# Patient Record
Sex: MALE | Race: WHITE
[De-identification: names, ages, dates, MRNs, and addresses within clinical notes are randomized per-mention and may not be internally consistent; named-entity substitution may affect disease eponyms.]

---

## 2017-03-31 ENCOUNTER — HOSPITAL ENCOUNTER (OUTPATIENT)
Dept: HOSPITAL 39 - GMAM | Age: 72
End: 2017-03-31
Attending: FAMILY MEDICINE
Payer: MEDICARE

## 2017-03-31 DIAGNOSIS — R53.82: Primary | ICD-10-CM

## 2017-04-10 ENCOUNTER — HOSPITAL ENCOUNTER (OUTPATIENT)
Dept: HOSPITAL 39 - CT | Age: 72
Discharge: HOME | End: 2017-04-10
Attending: FAMILY MEDICINE
Payer: MEDICARE

## 2017-04-10 DIAGNOSIS — R93.8: Primary | ICD-10-CM

## 2017-04-10 NOTE — CT
EXAM DESCRIPTION: 

Chest w/Contrast



CLINICAL HISTORY: 

ABNORMAL CXR



COMPARISON: 

None.



TECHNIQUE: 

Post contrasted multidetector CT imaging of the chest.

Multiplanar reconstructions were provided.



FINDINGS: 

The axilla are clear. Supraclavicular soft tissues are

unremarkable. Subcutaneous infusing catheter noted. Tip is in the

SVC. Heart size is unremarkable. No pericardial disease. No

lymphadenopathy within the mediastinum. No hilar lymphadenopathy.



There is a spiculated masslike area seen lateral to the right

hilum within the right upper lobe. This measures 2.2 cm in

diameter. Minimal pleural thickening noted.



IMPRESSION: 

Today's exam demonstrates a wedge-shaped spiculated area

consolidation within the base of the right upper lobe. The

differential based on the shape and border could include focal

pneumonia, a pulmonary infarct from pulmonary embolus or a

malignancy. After treatment I would recommend close interval

follow-up with repeat CT in 2-3 months to document resolution.



Electronically signed by:  Floyd Davis MD  4/10/2017 10:21 AM

CDT

## 2017-04-11 ENCOUNTER — HOSPITAL ENCOUNTER (INPATIENT)
Dept: HOSPITAL 39 - ER | Age: 72
LOS: 2 days | Discharge: HOME | DRG: 291 | End: 2017-04-13
Attending: NURSE PRACTITIONER | Admitting: INTERNAL MEDICINE
Payer: MEDICARE

## 2017-04-11 DIAGNOSIS — Z95.810: ICD-10-CM

## 2017-04-11 DIAGNOSIS — I42.8: ICD-10-CM

## 2017-04-11 DIAGNOSIS — I11.0: Primary | ICD-10-CM

## 2017-04-11 DIAGNOSIS — K21.9: ICD-10-CM

## 2017-04-11 DIAGNOSIS — Z79.84: ICD-10-CM

## 2017-04-11 DIAGNOSIS — I25.2: ICD-10-CM

## 2017-04-11 DIAGNOSIS — I50.9: ICD-10-CM

## 2017-04-11 DIAGNOSIS — Z87.891: ICD-10-CM

## 2017-04-11 DIAGNOSIS — E78.5: ICD-10-CM

## 2017-04-11 DIAGNOSIS — Z86.718: ICD-10-CM

## 2017-04-11 DIAGNOSIS — I48.91: ICD-10-CM

## 2017-04-11 DIAGNOSIS — Z79.01: ICD-10-CM

## 2017-04-11 DIAGNOSIS — Z79.899: ICD-10-CM

## 2017-04-11 DIAGNOSIS — K44.9: ICD-10-CM

## 2017-04-11 DIAGNOSIS — J18.9: ICD-10-CM

## 2017-04-11 DIAGNOSIS — E11.9: ICD-10-CM

## 2017-04-11 DIAGNOSIS — R91.8: ICD-10-CM

## 2017-04-11 NOTE — ED.PDOC
History of Present Illness





- General


Chief Complaint: Respiratory Problem


Stated Complaint: cough,sob


Time Seen by Provider: 17 23:37


Source: patient


Exam Limitations: no limitations





- History of Present Illness


Initial Comments: 


James Soto 72 y/o male with history of afib and seasonal allergies dm2 stated 

that he had been having non productive cough for 10 days and was seen by his md 

this morning regarding his chest CT report showing right upper lobe spiculated 

mass differential dx focal pna,pulmonary infarct or malignancy.He was sent home 

no medication prescribed and tonight cough got worse and getting more sob and 

had low grade fever decided to come to er.


Timing/Duration: other - 10 days ago


Severity: moderate


Possible Cause: chronic episodes


Improving Factors: nothing


Worsening Factors: nothing


Associated Symptoms: fever/chills, shortness of breath, wheezing


Respiratory Risk Factors: exposure to allergen


Allergies/Adverse Reactions: 


Allergies





NO KNOWN ALLERGY Allergy (Verified 17 23:44)


 








Home Medications: 


Ambulatory Orders





Carvedilol [Coreg] 3.125 mg PO BID 11/27/15 


Fluoxetine HCl [Prozac] 30 mg PO DAILY 11/27/15 


Lasix  mg PO DAILY 11/27/15 


Lisinopril 20 mg PO DAILY 11/27/15 


Omeprazole Magnesium [Prilosec Otc] 20 mg PO ACBK 11/27/15 


Sotalol HCl [Betapace] 60 mg PO BID 11/27/15 


Warfarin Sodium [Coumadin] 5 mg PO SUTUTHSA@2100 11/27/15 


Warfarin Sodium [Coumadin] 7.5 mg PO MOWEFR@2100 11/27/15 











Review of Systems





- Review of Systems


Constitutional: States: fever


EENTM: States: nose congestion


Respiratory: States: cough, short of breath


Cardiology: States: no symptoms reported


Gastrointestinal/Abdominal: States: no symptoms reported


Genitourinary: States: no symptoms reported


Musculoskeletal: States: no symptoms reported


Skin: States: no symptoms reported


Neurological: States: no symptoms reported


Endocrine: States: no symptoms reported





Past Medical History (General)





- Patient Medical History


Hx Seizures: No


Hx Stroke: No


Hx Asthma: No


Hx of COPD: No


Hx Cardiac Disorders: Yes - A-Fib, CHF


Hx Congestive Heart Failure: Yes


Hx Pacemaker: Yes


Hx Hypertension: Yes


Hx Diabetes: Yes


Hx MRSA: No


Hx Other PMH: Yes - seasonal allergies


Surgical History: other - aicd/pacemaker;nasal polyp surgery





- Vaccination History


Hx Tetanus, Diphtheria Vaccination: Yes





- Social History


Hx Tobacco Use: Yes - stopped age 27 years


Hx Alcohol Use: Yes - quit 3 years ago


Hx Substance Use: Yes -  cocaine


Hx Physical Abuse: No


Hx Emotional Abuse: No





- Activities of Daily Living


Patient Lives Alone: No - family


Grooming Ability: Independent


Eating (Feeding) Ability: Independent


Toileting Ability: Independent





- Female History


Patient Pregnant: No





Family Medical History





- Family History


  ** Father


Living Status: 


Age at Death (years of age): 60


Cause of Death: MI


Hx Family Asthma: Yes - mom


Hx Family Hypertension: Yes


Hx Cardiac Disease: Yes - dad





  ** Mother


Living Status: 


Hx Family Asthma: Yes





Physical Exam





- Physical Exam


General Appearance: Alert, No apparent distress


Eye Exam: bilateral normal


ENT Exam: normal ENT inspection, hearing grossly normal, TMs normal, pharynx 

normal


Neck: non-tender, full range of motion, supple


Respiratory: chest non-tender, decreased breath sounds, wheezing


Cardiovascular/Chest: normal peripheral pulses, regular rate, rhythm, no edema, 

no gallop, no JVD, no murmur


Gastrointestinal/Abdominal: normal bowel sounds, non tender, soft, no 

organomegaly


Extremity: normal range of motion, non-tender, normal inspection, no pedal edema

, no calf tenderness


Neurologic: no motor/sensory deficits, alert, normal mood/affect, oriented x 3


Skin Exam: normal color, warm/dry


Lymphatic: no adenopathy





Progress





- Results/Orders


Results/Orders: 


 





17 23:47


SVN/Updraft Therapy .ONCE 





17 23:51


YOHANA IFA Screen w/Reflex Stat 


RHEUMATOID FACTOR Routine 





17 23:55


IV Care:Saline Lock per Protoc QSHIFT 





17 09:00


[facility] Daily 








 Laboratory Results











WBC  12.2 K/mm3 (4.8-10.8)  H  17  00:10    


 


RBC  5.46 M/mm3 (4.70-6.10)   17  00:10    


 


Hgb  14.6 gm/dL (14.0-18.0)   17  00:10    


 


Hct  44.9 % (42.0-52.0)   17  00:10    


 


MCV  82.3 fl (80.0-94.0)   17  00:10    


 


MCH  26.8 pg (27.0-31.0)  L  17  00:10    


 


MCHC  32.6 g/dL (33.0-37.0)  L  17  00:10    


 


RDW  15.6 % (11.5-14.5)  H  17  00:10    


 


Plt Count  188 K/mm3 (130-400)   17  00:10    


 


MPV  9.7 fl (7.40-10.4)   17  00:10    


 


Absolute Neuts (auto)  9.50 K/uL (1.8-6.8)  H  17  00:10    


 


Absolute Lymphs (auto)  1.50 K/uL (1.0-3.4)   17  00:10    


 


Absolute Monos (auto)  0.90 K/uL (0.2-0.8)  H  17  00:10    


 


Absolute Eos (auto)  0.20 K/uL (0.0-0.4)   17  00:10    


 


Absolute Basos (auto)  0.10 K/uL (0.0-0.1)   17  00:10    


 


Neutrophils %  78.2 % (42.0-78.0)  H  17  00:10    


 


Lymphocytes %  12.6 % (20.0-50.0)  L  17  00:10    


 


Monocytes %  7.1 % (2.0-9.0)   17  00:10    


 


Eosinophils %  1.4 % (1.0-5.0)   17  00:10    


 


Basophils %  0.7 % (0.0-2.0)   17  00:10    


 


ESR  12 mm/hr (0-20)   17  00:10    


 


PT  25.1 SECONDS (9.4-12.5)  H*  17  00:10    


 


INR  2.240   17  00:10    


 


Sodium  136 mmol/L (135-145)   17  00:10    


 


Potassium  4.1 mmol/L (3.6-5.0)   17  00:10    


 


Chloride  105 mmol/L (101-111)   17  00:10    


 


Carbon Dioxide  23 mmol/L (21-31)   17  00:10    


 


Anion Gap  12.1  (12-18)   17  00:10    


 


BUN  15 mg/dL (7-18)   17  00:10    


 


Creatinine  0.93 mg/dL (0.6-1.3)   17  00:10    


 


BUN/Creatinine Ratio  16.1  (10-20)   17  00:10    


 


Random Glucose  130 mg/dL ()  H  17  00:10    


 


Serum Osmolality  274.5 mOsm/L (275-295)  L  17  00:10    


 


Calcium  9.0 mg/dL (8.4-10.2)   17  00:10    


 


Total Bilirubin  0.8 mg/dL (0.2-1.0)   17  00:10    


 


AST  20 IU/L (10-42)   17  00:10    


 


ALT  17 IU/L (10-60)   17  00:10    


 


Alkaline Phosphatase  62 IU/L ()   17  00:10    


 


B-Natriuretic Peptide  462.0 pg/ml (0-100)  H*  17  00:10    


 


Serum Total Protein  7.4 gm/dL (6.4-8.2)   17  00:10    


 


Albumin  3.8 g/dl (3.2-5.5)   17  00:10    


 


Globulin  3.6 gm/dL (2.3-3.5)  H  17  00:10    


 


Albumin/Globulin Ratio  1.1  (1.1-1.9)   17  00:10    














- EKG/XRAY/CT


XRAY: chest - bilateral interstitial and airspace opcification multifocal 

pneumonia





Departure





- Departure


Clinical Impression: 


 Atrial fibrillation with normal ventricular rate, Long term current use of 

anticoagulant therapy





Pneumonia


Qualifiers:


 Pneumonia type: due to unspecified organism Laterality: bilateral Lung location

: unspecified part of lung Qualifier Code: (J18.9) Pneumonia, unspecified 

organism


Time of Disposition: 01:32 - D/W Dr. Berry-HOSPITALIST


Disposition: Admit Patient


Condition: Fair


Referrals: 


Gregg Kumari MD [Primary Care Provider] - 1-2 Weeks


Home Medications: 


Ambulatory Orders





Carvedilol [Coreg] 3.125 mg PO BID 11/27/15 


Fluoxetine HCl [Prozac] 30 mg PO DAILY 11/27/15 


Lasix  mg PO DAILY 11/27/15 


Lisinopril 20 mg PO DAILY 11/27/15 


Omeprazole Magnesium [Prilosec Otc] 20 mg PO ACBK 11/27/15 


Sotalol HCl [Betapace] 60 mg PO BID 11/27/15 


Warfarin Sodium [Coumadin] 5 mg PO SUTUTHSA@2100 11/27/15 


Warfarin Sodium [Coumadin] 7.5 mg PO MOWEFR@2100 11/27/15

## 2017-04-12 RX ADMIN — IPRATROPIUM BROMIDE AND ALBUTEROL SULFATE SCH ML: .5; 3 SOLUTION RESPIRATORY (INHALATION) at 17:22

## 2017-04-12 RX ADMIN — SOTALOL HYDROCHLORIDE SCH MG: 80 TABLET ORAL at 10:51

## 2017-04-12 RX ADMIN — LEVOFLOXACIN SCH MLS/HR: 500 INJECTION, SOLUTION INTRAVENOUS at 03:00

## 2017-04-12 RX ADMIN — IPRATROPIUM BROMIDE AND ALBUTEROL SULFATE SCH ML: .5; 3 SOLUTION RESPIRATORY (INHALATION) at 08:33

## 2017-04-12 RX ADMIN — IPRATROPIUM BROMIDE AND ALBUTEROL SULFATE SCH ML: .5; 3 SOLUTION RESPIRATORY (INHALATION) at 12:40

## 2017-04-12 RX ADMIN — SOTALOL HYDROCHLORIDE SCH MG: 80 TABLET ORAL at 20:45

## 2017-04-12 RX ADMIN — CEFTRIAXONE SCH MLS/HR: 1 INJECTION, POWDER, FOR SOLUTION INTRAMUSCULAR; INTRAVENOUS at 14:30

## 2017-04-12 RX ADMIN — Medication SCH ML: at 20:45

## 2017-04-12 RX ADMIN — RIVAROXABAN ONE: 15 TABLET, FILM COATED ORAL at 23:12

## 2017-04-12 RX ADMIN — CARVEDILOL SCH MG: 3.12 TABLET, FILM COATED ORAL at 09:14

## 2017-04-12 RX ADMIN — Medication SCH ML: at 09:14

## 2017-04-12 RX ADMIN — OMEPRAZOLE SCH MG: 20 CAPSULE, DELAYED RELEASE ORAL at 05:48

## 2017-04-12 RX ADMIN — CARVEDILOL SCH MG: 3.12 TABLET, FILM COATED ORAL at 20:45

## 2017-04-12 RX ADMIN — IPRATROPIUM BROMIDE AND ALBUTEROL SULFATE SCH ML: .5; 3 SOLUTION RESPIRATORY (INHALATION) at 20:28

## 2017-04-12 RX ADMIN — LISINOPRIL SCH MG: 10 TABLET ORAL at 09:14

## 2017-04-12 RX ADMIN — CARVEDILOL SCH MG: 3.12 TABLET, FILM COATED ORAL at 23:04

## 2017-04-12 RX ADMIN — RIVAROXABAN ONE MG: 15 TABLET, FILM COATED ORAL at 23:03

## 2017-04-12 NOTE — PCM.CORE
Physician DVT/VTE





- Prophylaxis


Currently: Patient already on anticoagulation therapy





- Nurse DVT Assessment & Total


Each Risk Factor Represents 2 Points: Age 60-74


Each Risk Factor is 1 Point: Obesity (BMI >25)


DVT Assessment Score: 3





- 3-4 High Risk


Treatments: Early Ambulation *, Sequential Compression Device

## 2017-04-12 NOTE — RAD
EXAM: Chest,1 View



CLINICAL INDICATION: 71-year-old male with cough.



TECHNIQUE: Single view, AP portable chest was obtained.



COMPARISON:  11/27/2015. 



FINDINGS: 



Stable cardiac and mediastinal silhouette. Heart size is top

normal. Pacemaker device overlies and obscures portions of the

the LEFT hemithorax with leads intact at the level of the battery

pack, stable in course and termination. Tortuous thoracic aorta.

Diffuse bilateral basilar airspace and interstitial opacification

raising the concern for edema versus multifocal pneumonia. No

gross pneumothoraces or large pleural effusion. The RIGHT

costophrenic angle is not included in the field-of-view of the

examination. The visualized bones are within normal limits. 



IMPRESSION: Bilateral interstitial and airspace opacification

raising the concern for multifocal pneumonia versus edema. Please

correlate with patient clinical findings and follow-up for

resolution.



Electronically signed by:  Teri Arenas MD  4/12/2017 12:34 AM CDT

## 2017-04-13 VITALS — DIASTOLIC BLOOD PRESSURE: 77 MMHG | OXYGEN SATURATION: 94 % | SYSTOLIC BLOOD PRESSURE: 116 MMHG

## 2017-04-13 VITALS — TEMPERATURE: 97 F

## 2017-04-13 RX ADMIN — LEVOFLOXACIN SCH MLS/HR: 500 INJECTION, SOLUTION INTRAVENOUS at 02:38

## 2017-04-13 RX ADMIN — Medication SCH ML: at 09:36

## 2017-04-13 RX ADMIN — OMEPRAZOLE SCH MG: 20 CAPSULE, DELAYED RELEASE ORAL at 06:19

## 2017-04-13 RX ADMIN — IPRATROPIUM BROMIDE AND ALBUTEROL SULFATE SCH ML: .5; 3 SOLUTION RESPIRATORY (INHALATION) at 12:35

## 2017-04-13 RX ADMIN — CEFTRIAXONE SCH MLS/HR: 1 INJECTION, POWDER, FOR SOLUTION INTRAMUSCULAR; INTRAVENOUS at 01:28

## 2017-04-13 RX ADMIN — IPRATROPIUM BROMIDE AND ALBUTEROL SULFATE SCH ML: .5; 3 SOLUTION RESPIRATORY (INHALATION) at 07:40

## 2017-04-13 RX ADMIN — SOTALOL HYDROCHLORIDE SCH MG: 80 TABLET ORAL at 09:36

## 2017-04-13 RX ADMIN — LISINOPRIL SCH MG: 10 TABLET ORAL at 09:35

## 2017-04-13 RX ADMIN — CEFTRIAXONE SCH MLS/HR: 1 INJECTION, POWDER, FOR SOLUTION INTRAMUSCULAR; INTRAVENOUS at 13:56

## 2017-04-13 NOTE — RAD
Clinical History : Pneumonia , MAIN



Exam : PA and lateral views of the chest 4/13/2017 7:00 AM CDT



Comparisons : 

Portable AP view of the chest April 12, 2017



Findings : 



There is a stable focal airspace opacity in the right mid lung

laterally.

There is blunting of the bilateral costophrenic angles consistent

with trace pleural effusions.



.



The heart is stable in size.  The mediastinal contours are normal

in appearance.  

There is a left chest 2 lead pacer in place.



The thoracic spine is age appropriate.  The shoulders are

unremarkable.



Limited evaluation of the upper abdomen demonstrates no gross

abnormalities.



Impression:





1. Stable focal airspace opacity in the right mid lung laterally.

2. Trace bilateral pleural effusions.

 



Electronically signed by:  Brittni Sandoval MD  4/13/2017 7:01 AM

CDT

## 2017-04-16 NOTE — DS
SUPERVISING PHYSICIAN:  Jez Chavez M.D.



DISCHARGE DIAGNOSIS: 

1.   Bibasilar pneumonia with the patient showing hypoxemia on admission

      versus exacerbation of his congestive heart failure felt to be more 
likely related

      to congestive heart failure.

2.   Acute on chronic congestive heart failure secondary to nonischemic 

      cardiomyopathy with last echocardiogram in 2016 showing an ejection 

      fraction of 35%.

3.   Atrial fibrillation with admission EKG showing normal ventricular rate 
having 

      been on long term anticoagulation therapy with Coumadin as well as an 

      ICD in place.

4.   Hypertension.

5.   Diabetes mellitus type 2 on oral therapy.

6.   Gastroesophageal reflux disease.

7.   History of recent abnormal CT of the chest showing a spiculated mass of the

      right upper lobe that has been followed in the outpatient setting by Dr. Chavez.

8.   Leukocytosis with initial concerns for the development of pneumonia which 

      was likely community acquired requiring ongoing oxygen supplementation 

      and bronchodilator therapy.



HISTORY OF PRESENT ILLNESS:  Mr. Soto is a 71 year-old  male patient 
that has a history of atrial fibrillation and diabetes.  He noted that he had 
started having a nonproductive cough 10 days previous to admission.  He had 
been seen in the office by Dr. Chavez the previous day regarding followup on a 
chest x-ray and then had a CT of the chest with concerns for a right upper lobe 
spiculated mass.  Questionable differential was focal pulmonary embolism versus 
pulmonary infarct or malignancy.  He went home and the night of admission 
started developing a cough that worsened and he became short of breath as well 
as had a low-grade fever at home at which time he presented to the Emergency 
Department.  In the Emergency Department, a chest x-ray was completed and per 
radiology interpretation showed bilateral interstitial airspace opacification 
with concerns for multifocal pneumonia versus edema.  His laboratory studies 
showed a mildly elevated white count of 12.2 with an early left shift.  He also 
had an elevated BNP at 462.  Initially in the Emergency Department he presented 
afebrile with a temperature of 99.5 showing saturations of 86% on room air.  
After breathing treatments and supplemental oxygen with nasal cannula, he 
improved to 93%.  Given the fact that there were concerns for radiographic 
studies showing possible pneumonia versus edema and having a history of 
congestive heart failure with the last echocardiogram noted to be in 2016 
showing an ejection fraction of 21%, the patient was admitted for continuation 
of treatment and evaluation.  He was admitted to the Medical/Surgical floor in 
stable condition.



LABORATORY STUDIES:  White count was elevated on admission and remained so 
through discharge at 12.4.  Hemoglobin and hematocrit were stable at 13.4 and 
40.3, platelet count 178,000.  He did have a left shift that was resistant.  
Coagulation studies showed PT 25.1 on admission with INR of 2.24 with discharge 
INR of 1.6.  Chemistries showed sodium 133 at discharge with potassium 4.4, BUN 
16, creatinine 0.86, calcium 8.3.  He did have a BNP elevated at 462.  Liver 
functions otherwise were within normal limits.  Urinalysis showed 100 glucose 
and 15 ketones.  Microscopic showed to be within normal limits.  He had a 
rheumatoid factor that was 10 and an YOHANA screen that was negative.  Blood 
cultures times 2 remained negative at 4 days.  No sputum culture was ever 
submitted.



RADIOLOGY:  Initial chest x-ray in the Emergency Department showed per 
radiology interpretation bilateral interstitial and airspace opacification 
raising concerns for multifocal pneumonia versus edema.  This was followed-up 
with a repeat chest x-ray on the morning of discharge on 04/13/17 and per 
radiology interpretation showed stable focal airspace opacification in the 
right middle lung laterally and a trace of bilateral pleural effusions.



HOSPITAL COURSE:  Mr. Soto was admitted from the Emergency Department as noted 
for concerns for bilateral pneumonia versus pulmonary edema.  He was initially 
started on prednisone in the Emergency Department as well as bronchodilator 
therapy with DuoNeb treatments as well as antibiotic coverage with Levaquin and 
Azithromycin.  He was given Lasix 40 mg IV the morning of admission and had 
good diuresis.  His Xarelto was continued as previous to admission and all 
other medications as well.  He did well clinically and was felt well enough to 
be discharged on the morning of discharge as it was felt that his symptomology 
was secondary to acute on chronic congestive heart failure with some pulmonary 
edema, less likely a pneumonia process.  However, given the fact that he did 
have a slightly elevated white count, antibiotics were continued at time of 
discharge.



PLAN:  Mr. Soto was discharged on 04/13/17 to have close clinical followup with 
Dr. Chavez in 2 weeks or earlier if needed and to have close clinical followup 
arranged through Dr. Chavez's office with his cardiologist, Dr. Alicia in 1 
to 2 weeks, again earlier if needed.  He was instructed to resume his home 
medications as instructed and return to the hospital should he have concerning 
symptoms or failure to show any improvement.  He is encourage to restrict his 
daily fluids to less than 1800 mL in a 24 hour period.  He was started on new 
prescriptions at time of discharge to include:



1.   Potassium chloride, K-Dur 20 mEq daily at breakfast, #30.

2.   Lasix 20 mg daily, #30.



Continue with his antibiotic therapy to include Levaquin 500 mg for a total of 
8 days.  All other medications were resumed as per prior to admission.  He was 
discharged with diet to include low fat, low salt, cardiac diet.  Activity was 
to increase activity as tolerated, to do no strenuous exercising until he was 
seen in followup.  Condition at discharge was stable.



#[id number]
[facility]

## 2017-05-22 ENCOUNTER — HOSPITAL ENCOUNTER (OUTPATIENT)
Dept: HOSPITAL 39 - CT | Age: 72
Discharge: HOME | End: 2017-05-22
Payer: MEDICARE

## 2017-05-22 DIAGNOSIS — R91.1: Primary | ICD-10-CM

## 2017-05-22 NOTE — CT
EXAM DESCRIPTION: 



Chest w/o Contrast



CLINICAL HISTORY: 



71 years, Male, PULMONARY NODULE



COMPARISON: 



April 10, 2017



TECHNIQUE: 



Thin-section noncontrast axial CT images are obtained according

to our protocol. Reconstructed MPR images are created and

reviewed as well.



This exam was performed according to our departmental

dose-optimization program, which includes automated exposure

control, adjustment of the mA and/or kV according to patient size

and/or use of iterative reconstruction technique.



FINDINGS: 



Spiculated pulmonary nodule is present in the right upper lobe

just above the minor fissure. This spiculated pulmonary nodule

measures 2.6 x 2.1 x 1.9 cm in size and is concerning for primary

bronchogenic carcinoma.

There is no other nodule observed.

There is no effusion.

Suspicious mediastinal lymphadenopathy is present along the right

anterolateral margin of the trachea. The largest node is

approximately 1.5 cm in size.

Since the previous study from April 10 strandy changes

surrounding this spiculated nodule have decreased.



IMPRESSION: 



1.  Spiculated right upper lobe pulmonary nodule worrisome for

bronchogenic carcinoma. Pulmonary consultation is recommended.

This nodule may be accessible for bronchoscopic biopsy or

bronchoalveolar lavage. CT-guided biopsy would also be feasible.



Electronically signed by:  Zen Mariano MD  5/22/2017 11:13 AM

CDT

## 2017-05-26 ENCOUNTER — HOSPITAL ENCOUNTER (EMERGENCY)
Dept: HOSPITAL 39 - ER | Age: 72
Discharge: HOME | End: 2017-05-26
Payer: MEDICARE

## 2017-05-26 VITALS — OXYGEN SATURATION: 93 % | SYSTOLIC BLOOD PRESSURE: 108 MMHG | DIASTOLIC BLOOD PRESSURE: 52 MMHG | TEMPERATURE: 97.9 F

## 2017-05-26 DIAGNOSIS — R91.1: ICD-10-CM

## 2017-05-26 DIAGNOSIS — Z79.899: ICD-10-CM

## 2017-05-26 DIAGNOSIS — I95.1: Primary | ICD-10-CM

## 2017-05-26 DIAGNOSIS — I25.2: ICD-10-CM

## 2017-05-26 DIAGNOSIS — I10: ICD-10-CM

## 2017-05-26 DIAGNOSIS — Z82.49: ICD-10-CM

## 2017-05-26 DIAGNOSIS — Z95.0: ICD-10-CM

## 2017-05-26 DIAGNOSIS — Z87.891: ICD-10-CM

## 2017-05-26 DIAGNOSIS — Z79.82: ICD-10-CM

## 2017-05-26 DIAGNOSIS — K21.9: ICD-10-CM

## 2017-05-26 NOTE — ED.PDOC
History of Present Illness





- General


Chief Complaint: Cardiovascular Problem


Stated Complaint: Exertional dyspnea, chest tightness


Time Seen by Provider: 17 13:12


Source: patient


Exam Limitations: no limitations





- History of Present Illness


Initial Comments: 





the patient is a 71-year-old  male presenting to the emergency room 

secondary to a feeling of mild progressive shortness of breath and continued 

fatigue.  These have been long-standing problems with him over the last year 

and have been largely related to medications required for heart rate and rhythm 

control.  The patient had an ablation 2 days ago.  The ablation apparently went 

well.  He has had no new swelling.  He is not having any chest pain.  He 

reports that his blood pressures normally run in the 1 teens on the systolic 

end and that is what they are here today.  He however does have a positive tilt 

with a 23 point drop in his systolic blood pressure without any corresponding 

rise in his pulse.  he is reporting some mild dizziness when he goes to stand 

up.  He is not having any shortness of breath while resting but he does get 

short of breath with activity.  No palpitations.  Again no chest pain.  He 

feels weak when he tries to be active.  Again these are not new symptoms but he 

does feel they are somewhat progressive.


Timing/Duration: unsure


Severity: moderate


Improving Factors: nothing, rest


Worsening Factors: movement


Associated Symptoms: shortness of breath


Allergies/Adverse Reactions: 


Allergies





NO KNOWN ALLERGY Allergy (Verified 17 13:49)


 








Home Medications: 


Ambulatory Orders





Fluoxetine HCl [Prozac] 20 mg PO DAILY 11/27/15 


Omeprazole Magnesium [Prilosec Otc] 20 mg PO ACBK 11/27/15 


Sotalol HCl [Betapace] 160 mg PO BID 11/27/15 


Carvedilol [Coreg] 6.25 mg PO BID 17 


Metformin HCl 500 mg PO BID 17 


Mexiletine HCl 150 mg PO BID 17 


Aspirin [Aspirin EC] 81 mg PO DAILY 17 


Diphenhydramine-Acetaminophen [Acetaminophen Pm Extra St 500-25 mg] 1 - 2 tab 

PO BEDTIME PRN 17 


Sacubitril-Valsartan [Entresto 49-51 mg] 1 tab PO BID 17 











Review of Systems





- Review of Systems


Constitutional: States: malaise, weakness


EENTM: States: no symptoms reported


Respiratory: States: short of breath - ith activity


Cardiology: States: no symptoms reported


Gastrointestinal/Abdominal: States: no symptoms reported


Genitourinary: States: no symptoms reported


Musculoskeletal: States: no symptoms reported


Skin: States: no symptoms reported


Neurological: States: depressed


Endocrine: States: no symptoms reported


All other Systems: No Change from Baseline





Past Medical History (General)





- Patient Medical History


Hx Seizures: No


Hx Stroke: No


Hx Asthma: No


Hx of COPD: No


Hx Cardiac Disorders: Yes - MI; cardiac ablation


Hx Congestive Heart Failure: No


Hx Pacemaker: Yes


Hx Hypertension: Yes


Hx Diabetes: No


Hx Gastroesophageal Reflux: Yes


Hx MRSA: No





- Vaccination History


Hx Tetanus, Diphtheria Vaccination: Yes


Hx Influenza Vaccination: No


Hx Pneumococcal Vaccination: No





- Social History


Hx Tobacco Use: Yes - Quit 


Hx Alcohol Use: No


Hx Substance Use: No


Hx Physical Abuse: No


Hx Emotional Abuse: No





- Female History


Patient Pregnant: No





Family Medical History





- Family History


  ** Father


Living Status: 


Age at Death (years of age): 62


Cause of Death: MI


Hx Family Asthma: Yes - mom


Hx Family Hypertension: Yes


Hx Cardiac Disease: Yes - dad





  ** Mother


Family History: Unknown


Living Status: 


Hx Family Asthma: Yes





Physical Exam





- Physical Exam


General Appearance: Alert, Comfortable, No apparent distress


Eye Exam: bilateral normal


Ears, Nose, Throat: normal ENT inspection, normal pharynx


Neck: full range of motion, supple, normal inspection


Respiratory: chest non-tender, lungs clear, normal breath sounds, no 

respiratory distress, no accessory muscle use


Cardiovascular/Chest: normal peripheral pulses, regular rate, rhythm, no edema


Peripheral Pulses: radial,right: 2+, radial,left: 2+, dorsalis pedis,right: 2+, 

dorsalis pedis,left: 2+, posterior tibialis,right: 2+, posterior tibialis,left: 

2+


Gastrointestinal/Abdominal: non tender, soft


Rectal Exam: deferred


Back Exam: normal inspection, no CVA tenderness, no vertebral tenderness


Extremity: normal range of motion, non-tender, normal inspection, no pedal edema

, normal capillary refill


Neurologic: alert, normal mood/affect, oriented x 3


Skin Exam: normal color


Comments: 





 Vital Signs - 24 hr











  17





  13:41 14:05 14:10


 


Temperature 97.2 F L  


 


Pulse Rate [ 80 75 78





Apical]   


 


Respiratory 14 12 14





Rate   


 


Blood Pressure 127/84 119/73 114/72





[Left Arm]   


 


O2 Sat by Pulse 91 L 93 L 95





Oximetry   














  17





  14:15 15:10 16:52


 


Temperature   


 


Pulse Rate [ 93 H 77 71





Apical]   


 


Respiratory 14 14 16





Rate   


 


Blood Pressure 94/66 113/78 123/85





[Left Arm]   


 


O2 Sat by Pulse 94 L 93 L 94 L





Oximetry   














Progress





- Progress


Progress: 





17 18:17


the patient is a 71-year-old  male presenting to the emergency room 

secondary to progressive malaise and weakness and dyspnea on exertion.  The 

patient is tilt positive.  I'm going to have him discontinue his Coreg as he is 

still taking sotalol.  He needs to maintain a symptom diary for his 

cardiologist.  He also needs to record his blood pressures 4 times a day at 

rest.  He has remained on telemetry monitoring here for approximately 5 hours 

and he appears to be in a normal sinus rhythm.  No evidence of any superimposed 

infection was found.  Cardiac enzymes were trending downward since his 

ablation.  He needs to keep follow-up with his primary care doctor early next 

week and with his cardiologist as previously scheduled.  ER warnings were given 

for any acute worsening.he should also increase his fluid intake over the next 2

-3 days only.





- Results/Orders


Results/Orders: 





 Laboratory Tests











  17





  14:02 14:02 14:02


 


WBC   7.3 


 


RBC   5.37 


 


Hgb   14.7 


 


Hct   44.5 


 


MCV   82.9 


 


MCH   27.4 


 


MCHC   33.1 


 


RDW   15.4 H 


 


Plt Count   170 


 


MPV   9.3 


 


Absolute Neuts (auto)   4.90 


 


Absolute Lymphs (auto)   1.60 


 


Absolute Monos (auto)   0.50 


 


Absolute Eos (auto)   0.20 


 


Absolute Basos (auto)   0.10 


 


Neutrophils %   68.0 


 


Lymphocytes %   21.5 


 


Monocytes %   6.8 


 


Eosinophils %   2.5 


 


Basophils %   1.2 


 


PT    18.4 H


 


INR    1.640


 


PTT (SP)    39.9 H


 


Sodium  138  


 


Potassium  4.1  


 


Chloride  104  


 


Carbon Dioxide  28  


 


Anion Gap  10.1 L  


 


BUN  8  


 


Creatinine  0.92  


 


BUN/Creatinine Ratio  8.7 L  


 


Random Glucose  164 H  


 


Serum Osmolality  277.6  


 


Calcium  9.3  


 


Total Bilirubin  0.6  


 


AST  33  


 


ALT  27  


 


Alkaline Phosphatase  56  


 


Creatine Kinase  106  


 


CK-MB (CK-2)  4.5 H  


 


CK-MB (CK-2) %  Not Reportable  


 


Troponin I  1.08 H*  


 


B-Natriuretic Peptide  127.0 H  


 


Serum Total Protein  7.4  


 


Albumin  3.9  


 


Globulin  3.5  


 


Albumin/Globulin Ratio  1.1  


 


Urine Color   


 


Urine Appearance   


 


Urine pH   


 


Ur Specific Gravity   


 


Urine Protein   


 


Urine Glucose (UA)   


 


Urine Ketones   


 


Urine Blood   


 


Urine Nitrite   


 


Urine Bilirubin   


 


Urine Urobilinogen   


 


Ur Leukocyte Esterase   


 


Urine RBC   


 


Urine WBC   


 


Ur Epithelial Cells   


 


Urine Bacteria   














  17





  14:05 17:02


 


WBC  


 


RBC  


 


Hgb  


 


Hct  


 


MCV  


 


MCH  


 


MCHC  


 


RDW  


 


Plt Count  


 


MPV  


 


Absolute Neuts (auto)  


 


Absolute Lymphs (auto)  


 


Absolute Monos (auto)  


 


Absolute Eos (auto)  


 


Absolute Basos (auto)  


 


Neutrophils %  


 


Lymphocytes %  


 


Monocytes %  


 


Eosinophils %  


 


Basophils %  


 


PT  


 


INR  


 


PTT (SP)  


 


Sodium  


 


Potassium  


 


Chloride  


 


Carbon Dioxide  


 


Anion Gap  


 


BUN  


 


Creatinine  


 


BUN/Creatinine Ratio  


 


Random Glucose  


 


Serum Osmolality  


 


Calcium  


 


Total Bilirubin  


 


AST  


 


ALT  


 


Alkaline Phosphatase  


 


Creatine Kinase   97


 


CK-MB (CK-2)   4.1


 


CK-MB (CK-2) %   Not Reportable


 


Troponin I   1.04 H*


 


B-Natriuretic Peptide  


 


Serum Total Protein  


 


Albumin  


 


Globulin  


 


Albumin/Globulin Ratio  


 


Urine Color  Yellow 


 


Urine Appearance  Clear 


 


Urine pH  6.5 


 


Ur Specific Gravity  1.015 


 


Urine Protein  Negative 


 


Urine Glucose (UA)  Negative 


 


Urine Ketones  Negative 


 


Urine Blood  Trace-lysed H 


 


Urine Nitrite  Negative 


 


Urine Bilirubin  Negative 


 


Urine Urobilinogen  0.2 


 


Ur Leukocyte Esterase  Negative 


 


Urine RBC  0-1 


 


Urine WBC  0 


 


Ur Epithelial Cells  0 


 


Urine Bacteria  0 








EKG shows normal sinus rhythm.  It is a paced ventricular rhythm.  Difficult to 

interpret otherwise.  No evidence of significant ST segment elevation or 

depression.


Chest x-ray shows no focal infiltrate.  There is the persistent leftmedial lung 

nodule.





Departure





- Departure


Clinical Impression: 


 Orthostasis





Disposition: Discharge to Home or Self Care


Condition: Fair


Departure Forms:  ED Discharge - Pt. Copy, Patient Portal Self Enrollment


Instructions:  DI for Orthostatic Hypotension


Diet: low salt diet


Activity: increase activity as tolerated


Referrals: 


Jez Chavez MD [Primary Care Provider] - 1-5 Days


Home Medications: 


Ambulatory Orders





Fluoxetine HCl [Prozac] 20 mg PO DAILY 11/27/15 


Omeprazole Magnesium [Prilosec Otc] 20 mg PO ACBK 11/27/15 


Sotalol HCl [Betapace] 160 mg PO BID 11/27/15 


Carvedilol [Coreg] 6.25 mg PO BID 17 


Metformin HCl 500 mg PO BID 17 


Mexiletine HCl 150 mg PO BID 17 


Aspirin [Aspirin EC] 81 mg PO DAILY 17 


Diphenhydramine-Acetaminophen [Acetaminophen Pm Extra St 500-25 mg] 1 - 2 tab 

PO BEDTIME PRN 17 


Sacubitril-Valsartan [Entresto 49-51 mg] 1 tab PO BID 17 








Additional Instructions: 


the patient is a 71-year-old  male presenting to the emergency room 

secondary to progressive malaise and weakness and dyspnea on exertion.  The 

patient is tilt positive.  I'm going to have him discontinue his Coreg as he is 

still taking sotalol.  He needs to maintain a symptom diary for his 

cardiologist.  He also needs to record his blood pressures 4 times a day at 

rest.  He has remained on telemetry monitoring here for approximately 5 hours 

and he appears to be in a normal sinus rhythm.  No evidence of any superimposed 

infection was found.  Cardiac enzymes were trending downward since his 

ablation.  He needs to keep follow-up with his primary care doctor early next 

week and with his cardiologist as previously scheduled.  ER warnings were given 

for any acute worsening.he should also increase his fluid intake over the next 2

-3 days only.

## 2017-05-26 NOTE — RAD
EXAM DESCRIPTION: 



Chest,2 Views



CLINICAL HISTORY: 



2d post ablation with shortness of breath



COMPARISON: 



April 13, 2017



FINDINGS: 



Two-view chest x-ray shows mild enlargement of the cardiac

silhouette without pulmonary vascular congestion. Left subclavian

transvenous cardiac pacer defibrillator is stable.

The lungs are mildly increased aeration. More prominent focus of

increased density in the right mid lung is seen in the area of

interstitial thickening on previous exam. Left lung is clear.

Costophrenic angles are sharp.

Mild spondylitic changes of the spine are seen.



IMPRESSION: 



More localized area of increased density in the right mid chest

is spiculated on recent CT scan of the chest worrisome for

bronchogenic carcinoma.



No pneumothorax or new infiltrate is seen.



Electronically signed by:  Guero Oviedo MD  5/26/2017 2:10 PM CDT

Workstation: [id number]

## 2017-09-01 ENCOUNTER — HOSPITAL ENCOUNTER (OUTPATIENT)
Dept: HOSPITAL 39 - GMAM | Age: 72
Discharge: HOME | End: 2017-09-01
Attending: FAMILY MEDICINE
Payer: MEDICARE

## 2017-09-01 DIAGNOSIS — Z12.5: Primary | ICD-10-CM

## 2018-02-13 ENCOUNTER — HOSPITAL ENCOUNTER (OUTPATIENT)
Dept: HOSPITAL 39 - GMAM | Age: 73
End: 2018-02-13
Attending: FAMILY MEDICINE
Payer: MEDICARE

## 2018-02-13 DIAGNOSIS — E29.8: Primary | ICD-10-CM

## 2019-02-05 ENCOUNTER — HOSPITAL ENCOUNTER (EMERGENCY)
Dept: HOSPITAL 39 - ER | Age: 74
Discharge: HOME | End: 2019-02-05
Payer: MEDICARE

## 2019-02-05 VITALS — OXYGEN SATURATION: 97 %

## 2019-02-05 VITALS — TEMPERATURE: 97.9 F

## 2019-02-05 DIAGNOSIS — Z79.82: ICD-10-CM

## 2019-02-05 DIAGNOSIS — Z79.899: ICD-10-CM

## 2019-02-05 DIAGNOSIS — Z79.01: ICD-10-CM

## 2019-02-05 DIAGNOSIS — Z95.810: ICD-10-CM

## 2019-02-05 DIAGNOSIS — I25.2: ICD-10-CM

## 2019-02-05 DIAGNOSIS — K21.9: ICD-10-CM

## 2019-02-05 DIAGNOSIS — E87.1: ICD-10-CM

## 2019-02-05 DIAGNOSIS — R53.1: ICD-10-CM

## 2019-02-05 DIAGNOSIS — D63.8: ICD-10-CM

## 2019-02-05 DIAGNOSIS — T82.897A: Primary | ICD-10-CM

## 2019-02-05 DIAGNOSIS — I10: ICD-10-CM

## 2019-02-05 NOTE — RAD
EXAM DESCRIPTION: 



Chest,1 View



CLINICAL HISTORY: 



ICD shocked pt this morning x 1. 



COMPARISON: 



None available 



FINDINGS: 



A multilead cardiac pacemaker remains in place. Again seen are

postoperative changes in the mediastinum. There is no airspace

consolidation or pleural effusion. The bronchovascular markings

are within normal limits, and the lungs are not hyperinflated.

There is no pneumothorax or acute fracture.



IMPRESSION: 



Postoperative changes in the mediastinum and cardiac pacemaker,

but no acute intrathoracic abnormality.



Electronically signed by:  Paul Carmona MD  2/5/2019 10:49 AM [facility]

Workstation: [id number]

## 2019-02-05 NOTE — ED.PDOC
History of Present Illness





- General


Chief Complaint: General


Stated Complaint: defib went off


Time Seen by Provider: 19 10:13


Source: patient, family


Exam Limitations: no limitations





- History of Present Illness


Initial Comments: 





ICD DISCHARGED THIS AM WHILE PT WAS STILL ASLEEP.  PT FEELS MILDLY WEAK SINCE 

THE SHOCK BUT NOT OTHER SX.  NO CP/SOB.


PT WAS ASX WHEN THE DISCHARGE OCCURRED.   ICD IS FOR A H/O V FIB.  





ICD PLACED APPROX 10 YRS AGO.  HE HAD IT CHECKED AT A ROUTINE F/U LAST WEEK AND 

IT WAS FUNCTIONING WELL. 





PT ALSO HAS A LEFT VENTRICULAR ASSIST DEVICE (LVAD) FOR END STAGE CHF.  PT HAS 

NO S/SX TODAY OF ACUTE HEART FAILURE EXACERBATION.    





PMH: NIDDM, CHF, H/O VFIB.  








Timing/Duration: 1-3 hours


Severity: moderate


Improving Factors: nothing


Worsening Factors: nothing


Associated Symptoms: weakness


Allergies/Adverse Reactions: 


Allergies





NO KNOWN ALLERGY Allergy (Verified 19 10:08)


   








Home Medications: 


Ambulatory Orders





Fluoxetine HCl [Prozac] 20 mg PO DAILY 11/27/15 


Omeprazole Magnesium [Prilosec Otc] 20 mg PO ACBK 11/27/15 


Carvedilol [Coreg] 6.25 mg PO BID 17 


Mexiletine HCl 150 mg PO TID 17 


Aspirin [(None)] 325 mg PO QD 19 


Calcium Carbonate [Chewable Calcium] 500 mg PO TID 19 


Cetirizine HCl [Zyrtec Allergy] 10 mg PO DAILY 19 


Furosemide [Lasix] 20 mg PO .2XWK 19 


Lisinopril 5 mg PO DAILY 19 


Multiple Vitamin [Multi Vitamin] 1 tab PO DAILY 19 


SITagliptin [Januvia] 100 mg PO DAILY 19 


Simvastatin [Zocor] 40 mg PO BEDTIME 19 


Spironolactone [Aldactone] 25 mg PO DAILY 19 


Warfarin Sodium [Coumadin] 5 mg PO DAILY 19 


Warfarin Sodium [Coumadin] 7.5 mg PO DAILY 19 


Wheat Dextrin [Benefiber] 1 pow PO DAILY 19 


diphenhydrAMINE HCL [Benadryl] 25 mg PO BEDTIME PRN 19 











Review of Systems





- Review of Systems


Constitutional: States: weakness.  Denies: chills, diaphoresis, fever, malaise


EENTM: States: no symptoms reported


Respiratory: Denies: cough, short of breath, wheezing


Cardiology: Denies: chest pain, edema, palpitations


Gastrointestinal/Abdominal: Denies: abdominal pain, nausea


Genitourinary: States: no symptoms reported


Musculoskeletal: States: no symptoms reported


Skin: States: no symptoms reported


Neurological: States: weakness.  Denies: headache


Endocrine: States: no symptoms reported


Hematologic/Lymphatic: States: no symptoms reported


All other Systems: Reviewed and Negative





Past Medical History (General)





- Patient Medical History


Hx Seizures: No


Hx Stroke: No


Hx Asthma: No


Hx of COPD: No


Hx Cardiac Disorders: Yes - MI; cardiac ablation


Hx Congestive Heart Failure: No


Hx Pacemaker: Yes


Hx Hypertension: Yes


Hx Diabetes: No


Hx Gastroesophageal Reflux: Yes


Hx Cancer: No


Hx Hepatitis C: No


Hx MRSA: No


Surgical History: pacemaker





- Vaccination History


Hx Tetanus, Diphtheria Vaccination: No


Hx Influenza Vaccination: Yes


Hx Pneumococcal Vaccination: No


Immunizations Up to Date: No





- Social History


Hx Tobacco Use: No


Hx Alcohol Use: No


Hx Substance Use: No


Hx Substance Use Treatment: No


Hx Depression: No


Hx Physical Abuse: No


Hx Emotional Abuse: No





- Female History


Patient Pregnant: No





Family Medical History





- Family History


  ** Father


Living Status: 


Age at Death (years of age): 62


Cause of Death: MI


Hx Family Asthma: Yes - mom


Hx Family Hypertension: Yes


Hx Cardiac Disease: Yes - dad





  ** Mother


Family History: Unknown


Living Status: 


Hx Family Asthma: Yes





Physical Exam





- Physical Exam


General Appearance: Alert, No apparent distress


Eye Exam: bilateral normal


Ears, Nose, Throat: hearing grossly normal, normal ENT inspection


Neck: supple, normal inspection


Respiratory: chest non-tender, lungs clear, normal breath sounds, no respiratory

 distress, no accessory muscle use


Cardiovascular/Chest: regular rate, rhythm, no JVD, other - S1 HAS A MECHANICAL 

SOUND, FROM THE LVAD.  


Peripheral Pulses: radial,right: 1+, radial,left: 1+, dorsalis pedis,right: 1+, 

dorsalis pedis,left: 1+, posterior tibialis,right: 1+, posterior tibialis,left: 

1+


Gastrointestinal/Abdominal: normal bowel sounds, non tender, no pulsatile mass


Back Exam: normal inspection, no CVA tenderness


Extremity: normal range of motion, normal inspection, no pedal edema


Neurologic: no motor/sensory deficits, alert, normal mood/affect


Skin Exam: normal color, warm/dry


Lymphatic: no adenopathy





Progress





- Progress


Progress: 





19 12:27


CMP: Na 133. 


CBC: HGB 11, MICROCYTIC, HYPOCHROMIC.  LIKELY FROM ANEMIA OF CHRONIC DZ (CHF, 

DM, ETC).


CARDIAC ENZ NEG.  CXR NEG. EKG NEG FOR ACUTE ST CHANGES. 


COAGS: INR 1.89.  PT IS ON COUMADIN, SO JUST SUBTHERAPEUTIC FOR 2-3 RANGE.  





PACEMAKER DISCHARGE TODAY - CARDIAC W/U NEG FOR ACUTE EVENTS AND PT HAS NO 

CARDIORESPIRATORY SX.  SAFE FOR DC TO HOME.  I EMPHASIZED THE IMPORTANCE OF 

SEEING HIS CARDIOLOGIST NEXT WEEK TO REVIEW THE DEFIBRILLATOR TO ASCERTAIN WHY 

IT DISCHARGED, IF IT WAS AN APPROPRIATE OR INAPPROPRIATE DISCHARGE, AND IF ANY 

ADJUSTMENT IN ICD SETTINGS MIGHT BE NEEDED.  








Departure





- Departure


Clinical Impression: 


 Defibrillator discharge, Generalized weakness, Anemia of chronic disease, 

Hyponatremia





Disposition: Discharge to Home or Self Care


Condition: Fair


Departure Forms:  ED Discharge - Pt. Copy, Patient Portal Self Enrollment


Instructions:  Heart Failure, Adult (DC)


Diet: resume usual diet


Activity: increase activity as tolerated


Referrals: 


Jez Chavez MD [Primary Care Provider] - 1-2 Weeks


Home Medications: 


Ambulatory Orders





Fluoxetine HCl [Prozac] 20 mg PO DAILY 11/27/15 


Omeprazole Magnesium [Prilosec Otc] 20 mg PO ACBK 11/27/15 


Carvedilol [Coreg] 6.25 mg PO BID 17 


Mexiletine HCl 150 mg PO TID 17 


Aspirin [(None)] 325 mg PO QD 19 


Calcium Carbonate [Chewable Calcium] 500 mg PO TID 19 


Cetirizine HCl [Zyrtec Allergy] 10 mg PO DAILY 19 


Furosemide [Lasix] 20 mg PO .2XWK 19 


Lisinopril 5 mg PO DAILY 19 


Multiple Vitamin [Multi Vitamin] 1 tab PO DAILY 19 


SITagliptin [Januvia] 100 mg PO DAILY 19 


Simvastatin [Zocor] 40 mg PO BEDTIME 19 


Spironolactone [Aldactone] 25 mg PO DAILY 19 


Warfarin Sodium [Coumadin] 5 mg PO DAILY 19 


Warfarin Sodium [Coumadin] 7.5 mg PO DAILY 19 


Wheat Dextrin [Benefiber] 1 pow PO DAILY 19 


diphenhydrAMINE HCL [Benadryl] 25 mg PO BEDTIME PRN 19 








Additional Instructions: 


Please call your cardiologist today, Dr. Martínez, to schedule an ER follow-up 

appointment for the same day you are seeing the digestive doctor.

## 2024-10-16 NOTE — HP
SUPERVISING PHYSICIAN:  Jez Chavez M.D.



CHIEF COMPLAINT:  Cough and shortness of breath.



HISTORY OF PRESENT ILLNESS:  Mr. Soto is a 71 year-old  male patient 
that has a history of atrial fibrillation and diabetes.  He noted that he had 
started having a nonproductive cough for the last 10 days.  He had been seen in 
the office with Dr. Chavez the previous day regarding followup on a chest x-ray 
having a CT of the chest for concerns for a right upper lobe spiculated mass.  
Questionable differential was focal pulmonary embolism versus pulmonary infarct 
or malignancy.  He went home and the night of admission started developing a 
cough that worsened and he became short of breath, and had a low-grade fever at 
home at which time he presented to the Emergency Department.  In the Emergency 
Department, a chest x-ray was completed and per radiology interpretation showed 
bilateral interstitial airspace opacification with concerns for multifocal 
pneumonia versus edema.  His laboratory studies showed a mildly elevated white 
count of 12.2 with an early left shift.  He also had an elevated BNP at 462.  
Initially in the Emergency Department he presented afebrile with a temperature 
of 99.5 showing saturations of 86% on room air.  After breathing treatments and 
supplemental oxygen with nasal cannula, he improved to 93.  Given the fact that 
he had concerns for radiographic studies showing possible pneumonia versus 
edema having a history of congestive heart failure with the last echocardiogram 
noted to be in  showing an ejection fraction of 21%, the patient was 
admitted to continuation of treatment and evaluation.  He was admitted to the 
Medical/Surgical floor in stable condition.



PAST MEDICAL HISTORY: 

1.   Congestive heart failure with last estimated ejection fraction of 21 to 25
% in

      2015.

2.   Hyperlipidemia.

3.   Hypertension.

4.   Myocardial infarction in .

5.   History of deep venous thrombosis of the upper extremity.

6.   History of atrial fibrillation currently on Xarelto.

7.   History of hiatal hernia.

8.   Right upper lung mass noted 2017 followed by Dr. Chavez.

9.   Type 2 diabetes mellitus on oral therapy.

10. Gastroesophageal reflux disease.



PAST SURGICAL HISTORY:

1.   Cataract removal.

2.   Nasal polypectomy.

3.   Tonsillectomy and adenoidectomy.

4.   Pacemaker defibrillator implantation.



HOME MEDICATIONS:

1.   Betapace 80 mg twice daily.

2.   Metformin 500 mg daily.

3.   Lisinopril 20 mg daily.

4.   Omeprazole 20 mg at breakfast.

5.   Prozac 30 mg daily.

6.   Xarelto 20 mg daily.

7.   Lasix 20 mg once daily.

8.   Coreg 6.25 mg twice daily.



ALLERGIES:  NO KNOWN DRUG ALLERGIES.



FAMILY HISTORY:  Mother  at age 72 from pneumonia.  Father  at 
age 60 from a myocardial infarction.



SOCIAL HISTORY:  The patient lives in [place], [place].  He is a retired .  He 
is .  He does have a history of 20 pack per year history of smoking, 
quit at age 47.  Denies any recent alcohol usage having quit in .



REVIEW OF SYSTEMS:  CONSTITUTIONAL:  Noted fever subjective at home.  HEENT: 
Notes nasal congestion.  No sore throat or headache.  RESPIRATORY: Positive as 
noted in the History of Present Illness with a nonproductive cough and 
significant shortness of breath with low O2 on room air.  CARDIOVASCULAR: 
Lengthy history of myocardial infarctions, congestive heart failure and atrial 
fibrillation, but denies any syncopal episodes, chest pains, palpitations.  
GENITOURINARY: Denies any increase in frequency, dysuria or other urinary 
symptoms.  NEUROLOGIC: Denies any vision changes, headaches, syncopal episodes 
or neurological deficits.



PHYSICAL EXAMINATION: 



VITAL SIGNS:  Temperature on admission was 99.5, pulse 101, blood pressure 141/
83, satting 86 to 89% on room air with respirations between 18 to 22 with 
obvious shortness of breath initially.  Upon admission to the Medical/Surgical 
floor, the patient was afebrile with temperature 98, pulse 89, blood pressure 
120/79, O2 saturations were showing 94% on nasal cannula at rest with 1 liter.  
Admission weight 110.4 kg.



GENERAL:  The patient appears to be well nourished, well hydrated and is in no 
acute distress at time of exam.  He is alert and oriented.



HEENT:  Tympanic membranes are clear bilaterally.  Oropharynx is pink with 
mucosal membranes moist.



NECK:  No jugular venous distention.  Neck is supple with full range of motion.



CHEST:  Lung sounds are decreased towards the bases bilaterally but no obvious 
rhonchi, wheezing or rales are noted.



CARDIOVASCULAR:  Regular rate and rhythm without any appreciable murmurs, 
gallops, or rubs.



ABDOMEN:  Obese but soft, non-tender.  Positive bowel sounds.



EXTREMITIES:  No clubbing, cyanosis or edema.



NEUROLOGIC:  Cranial nerves II-XII are grossly intact.  Facial features are 
symmetrical.  Extraocular movements are within normal limits.  There is no 
nystagmus.  There is no notable motor or sensory deficits.  He is alert and 
oriented times three.



LABORATORY:  White count on admission was 12.2, hemoglobin 14.6, hematocrit 44.9
, platelet count 188,000.  Differential shows a left shift.  Coagulation 
studies showed an INR of 2.24 with PT of 25.1.  Chemistries show normal 
electrolytes with potassium 4.1, BUN 15, creatinine 0.9, glucose 130.  Liver 
functions show to be within normal limits with BNP that was elevated at 462.  
Urinalysis showed 100 glucose with 15 ketones.  Microscopic showed to be within 
normal limits.



MICROBIOLOGY:  Blood culture is pending times 2.  Sputum culture is pending.



RADIOLOGY:  Chest x-ray on admission to the Emergency Department per radiology 
interpretation single view chest shows bilateral interstitial and airspace 
opacification raising concerns for multifocal pneumonia versus edema.



ASSESSMENT: 

1.   Bibasilar pneumonia with the patient showing hypoxemia on admission

      versus acute exacerbation of congestive heart failure.

2.   Acute on chronic congestive heart failure secondary to nonischemic 

      cardiomyopathy with last echocardiogram in  showing an ejection 

      fraction of 35%.

3.   Atrial fibrillation with current normal ventricular rate on long term

      anticoagulant therapy with Coumadin and implanted defibrillator

      pacemaker.  He has an ICD in place.

4.   Hypertension.

5.   Diabetes mellitus type 2 on oral therapy.

6.   Gastroesophageal reflux disease.

7.   History of recent abnormal CT of the chest indicating a spiculated

      mass right upper lobe being followed in the outpatient setting by

      Dr. Chavez.

8.   Leukocytosis with concerns for early development of pneumonia

      likely community acquired requiring oxygen supplementation and

      bronchodilator therapy.



PLAN:  The patient will be admitted to the hospital for continuation of 
treatment with concerns for development of pneumonia versus underlying 
pulmonary edema from acute exacerbation of congestive heart failure.  He will 
be given an extra dose of Lasix this morning IV 40 mg and closely monitored, 
and also placed on a fluid restriction less than 1500 mL for a 24 hour period.  
He will also be started on antibiotics which were initiated in the Emergency 
Department after blood cultures were completed which include Rocephin and 
Levaquin.  He will be given q.i.d. DuoNeb treatments and aggressive pulmonary 
hygiene, and await culture results from the sputum.  Anticipate length of stay 
to be 2 to 3 days.  Once the patient is clinically improved and able to be 
discharged to continue with outpatient treatment plan, he will need close 
clinical followup with his primary care physician, Dr. Chavez.  Until then, 
will follow the patient closely and treat appropriately.



#[id number] and [id number]
[facility]
PRE-OP DIAGNOSIS:  Bilateral inguinal hernia 16-Oct-2024 10:53:25  Augustine Yu